# Patient Record
Sex: MALE
[De-identification: names, ages, dates, MRNs, and addresses within clinical notes are randomized per-mention and may not be internally consistent; named-entity substitution may affect disease eponyms.]

---

## 2022-06-02 ENCOUNTER — HOSPITAL ENCOUNTER (INPATIENT)
Dept: HOSPITAL 95 - MEDS | Age: 67
LOS: 2 days | Discharge: HOME | DRG: 247 | End: 2022-06-04
Attending: HOSPITALIST | Admitting: INTERNAL MEDICINE
Payer: COMMERCIAL

## 2022-06-02 VITALS — BODY MASS INDEX: 22.63 KG/M2 | WEIGHT: 156.31 LBS | HEIGHT: 69.69 IN

## 2022-06-02 DIAGNOSIS — E03.9: ICD-10-CM

## 2022-06-02 DIAGNOSIS — I21.4: Primary | ICD-10-CM

## 2022-06-02 DIAGNOSIS — I10: ICD-10-CM

## 2022-06-02 DIAGNOSIS — Z79.899: ICD-10-CM

## 2022-06-02 DIAGNOSIS — I27.20: ICD-10-CM

## 2022-06-02 DIAGNOSIS — Z79.82: ICD-10-CM

## 2022-06-02 DIAGNOSIS — Z88.0: ICD-10-CM

## 2022-06-02 LAB
ALBUMIN SERPL BCP-MCNC: 2.6 G/DL (ref 3.4–5)
ALBUMIN/GLOB SERPL: 0.7 {RATIO} (ref 0.8–1.8)
ALT SERPL W P-5'-P-CCNC: 35 U/L (ref 12–78)
ANION GAP SERPL CALCULATED.4IONS-SCNC: 5 MMOL/L (ref 6–16)
AST SERPL W P-5'-P-CCNC: 19 U/L (ref 12–37)
BASOPHILS # BLD AUTO: 0.06 K/MM3 (ref 0–0.23)
BASOPHILS NFR BLD AUTO: 1 % (ref 0–2)
BILIRUB SERPL-MCNC: 0.7 MG/DL (ref 0.1–1)
BUN SERPL-MCNC: 24 MG/DL (ref 8–24)
CALCIUM SERPL-MCNC: 10 MG/DL (ref 8.5–10.1)
CHLORIDE SERPL-SCNC: 106 MMOL/L (ref 98–108)
CO2 SERPL-SCNC: 29 MMOL/L (ref 21–32)
CREAT SERPL-MCNC: 1.08 MG/DL (ref 0.6–1.2)
DEPRECATED RDW RBC AUTO: 43.8 FL (ref 35.1–46.3)
EOSINOPHIL # BLD AUTO: 0.15 K/MM3 (ref 0–0.68)
EOSINOPHIL NFR BLD AUTO: 2 % (ref 0–6)
ERYTHROCYTE [DISTWIDTH] IN BLOOD BY AUTOMATED COUNT: 13.7 % (ref 11.7–14.2)
GLOBULIN SER CALC-MCNC: 3.6 G/DL (ref 2.2–4)
GLUCOSE SERPL-MCNC: 92 MG/DL (ref 70–99)
HCT VFR BLD AUTO: 48 % (ref 37–53)
HGB BLD-MCNC: 15.8 G/DL (ref 13.5–17.5)
IMM GRANULOCYTES # BLD AUTO: 0.43 K/MM3 (ref 0–0.1)
IMM GRANULOCYTES NFR BLD AUTO: 4 % (ref 0–1)
LYMPHOCYTES # BLD AUTO: 2.53 K/MM3 (ref 0.84–5.2)
LYMPHOCYTES NFR BLD AUTO: 25 % (ref 21–46)
MAGNESIUM SERPL-MCNC: 2.5 MG/DL (ref 1.6–2.4)
MCHC RBC AUTO-ENTMCNC: 32.9 G/DL (ref 31.5–36.5)
MCV RBC AUTO: 88 FL (ref 80–100)
MONOCYTES # BLD AUTO: 0.96 K/MM3 (ref 0.16–1.47)
MONOCYTES NFR BLD AUTO: 10 % (ref 4–13)
NEUTROPHILS # BLD AUTO: 5.98 K/MM3 (ref 1.96–9.15)
NEUTROPHILS NFR BLD AUTO: 59 % (ref 41–73)
NRBC # BLD AUTO: 0 K/MM3 (ref 0–0.02)
NRBC BLD AUTO-RTO: 0 /100 WBC (ref 0–0.2)
PLATELET # BLD AUTO: 174 K/MM3 (ref 150–400)
POTASSIUM SERPL-SCNC: 4.2 MMOL/L (ref 3.5–5.5)
PROT SERPL-MCNC: 6.2 G/DL (ref 6.4–8.2)
SODIUM SERPL-SCNC: 140 MMOL/L (ref 136–145)
UFH PPP CHRO-ACNC: 0.63 IU/ML

## 2022-06-02 PROCEDURE — C1887 CATHETER, GUIDING: HCPCS

## 2022-06-02 PROCEDURE — C9600 PERC DRUG-EL COR STENT SING: HCPCS

## 2022-06-02 PROCEDURE — C1894 INTRO/SHEATH, NON-LASER: HCPCS

## 2022-06-02 PROCEDURE — C1769 GUIDE WIRE: HCPCS

## 2022-06-02 PROCEDURE — C1725 CATH, TRANSLUMIN NON-LASER: HCPCS

## 2022-06-02 PROCEDURE — A9270 NON-COVERED ITEM OR SERVICE: HCPCS

## 2022-06-02 PROCEDURE — C1874 STENT, COATED/COV W/DEL SYS: HCPCS

## 2022-06-03 LAB
ANION GAP SERPL CALCULATED.4IONS-SCNC: 6 MMOL/L (ref 6–16)
BASOPHILS # BLD AUTO: 0.06 K/MM3 (ref 0–0.23)
BASOPHILS NFR BLD AUTO: 1 % (ref 0–2)
BUN SERPL-MCNC: 25 MG/DL (ref 8–24)
CALCIUM SERPL-MCNC: 9.6 MG/DL (ref 8.5–10.1)
CHLORIDE SERPL-SCNC: 106 MMOL/L (ref 98–108)
CHOLEST SERPL-MCNC: 179 MG/DL (ref 50–200)
CHOLEST/HDLC SERPL: 3.1 {RATIO}
CO2 SERPL-SCNC: 28 MMOL/L (ref 21–32)
CREAT SERPL-MCNC: 1.14 MG/DL (ref 0.6–1.2)
DEPRECATED RDW RBC AUTO: 43.4 FL (ref 35.1–46.3)
EOSINOPHIL # BLD AUTO: 0.17 K/MM3 (ref 0–0.68)
EOSINOPHIL NFR BLD AUTO: 2 % (ref 0–6)
ERYTHROCYTE [DISTWIDTH] IN BLOOD BY AUTOMATED COUNT: 13.7 % (ref 11.7–14.2)
GLUCOSE SERPL-MCNC: 120 MG/DL (ref 70–99)
HCT VFR BLD AUTO: 47.2 % (ref 37–53)
HDLC SERPL-MCNC: 58 MG/DL (ref 39–?)
HGB BLD-MCNC: 15.7 G/DL (ref 13.5–17.5)
IMM GRANULOCYTES # BLD AUTO: 0.34 K/MM3 (ref 0–0.1)
IMM GRANULOCYTES NFR BLD AUTO: 3 % (ref 0–1)
LDLC SERPL CALC-MCNC: 86 MG/DL (ref 0–110)
LDLC/HDLC SERPL: 1.5 {RATIO}
LYMPHOCYTES # BLD AUTO: 2.29 K/MM3 (ref 0.84–5.2)
LYMPHOCYTES NFR BLD AUTO: 22 % (ref 21–46)
MCHC RBC AUTO-ENTMCNC: 33.3 G/DL (ref 31.5–36.5)
MCV RBC AUTO: 87 FL (ref 80–100)
MONOCYTES # BLD AUTO: 1.04 K/MM3 (ref 0.16–1.47)
MONOCYTES NFR BLD AUTO: 10 % (ref 4–13)
NEUTROPHILS # BLD AUTO: 6.51 K/MM3 (ref 1.96–9.15)
NEUTROPHILS NFR BLD AUTO: 63 % (ref 41–73)
NRBC # BLD AUTO: 0 K/MM3 (ref 0–0.02)
NRBC BLD AUTO-RTO: 0 /100 WBC (ref 0–0.2)
PLATELET # BLD AUTO: 181 K/MM3 (ref 150–400)
POTASSIUM SERPL-SCNC: 4.3 MMOL/L (ref 3.5–5.5)
SODIUM SERPL-SCNC: 140 MMOL/L (ref 136–145)
TRIGL SERPL-MCNC: 173 MG/DL (ref 30–160)
TSH SERPL DL<=0.005 MIU/L-ACNC: 0.15 UIU/ML (ref 0.36–4.8)
VLDLC SERPL CALC-MCNC: 34 MG/DL (ref 6–32)

## 2022-06-03 PROCEDURE — 4A023N7 MEASUREMENT OF CARDIAC SAMPLING AND PRESSURE, LEFT HEART, PERCUTANEOUS APPROACH: ICD-10-PCS

## 2022-06-03 PROCEDURE — 027034Z DILATION OF CORONARY ARTERY, ONE ARTERY WITH DRUG-ELUTING INTRALUMINAL DEVICE, PERCUTANEOUS APPROACH: ICD-10-PCS | Performed by: INTERNAL MEDICINE

## 2022-06-03 PROCEDURE — B2111ZZ FLUOROSCOPY OF MULTIPLE CORONARY ARTERIES USING LOW OSMOLAR CONTRAST: ICD-10-PCS

## 2022-06-03 NOTE — NUR
SHIFT ASSESSMENT
 
ASSUMED CARE OF PT @ 1900. TRANSLATION PHONE USED TO COMMUNICATE, PT ONLY
SPEAKS Estonian. PT ALERT AND ORIENTED, FOLLOWS ALL COMMANDS. C/O MILD CP,
MEDICATED c PRN ACETAMINOPHEN. CP DOES NOT RADIATE, PT DENIES SOB. VSS. SINUS/
SINUS TACH ON CARDIAC MONITOR. CALL LIGHT IN REACH, WILL MONITOR CLOSELY.

## 2022-06-03 NOTE — NUR
IN ROOM TO DISCUSS ANGIOGRAM AND POSSIBLE STENT PLACEMENT.
 PHONE USED, CONSENT SIGNED IN PT'S FIRST LANGUAGE. HEPARIN STOPPED
PER .

## 2022-06-03 NOTE — NUR
ASSUMED CARE
PT ARRIVED AT APPROX 2140 VIA WHEELCHAIR WITH ALL BELONGINGS. PT VITALS ARE
STABLE. DENIES CHEST PAIN BUT REPORTS FEELING SHORT OF BREATH. PT PUT ON 2L NC
AND HAS SATS OF ABOVE 92%.  WIFE IS AT BEDSIDE.

## 2022-06-03 NOTE — NUR
SUMMARY
NEURO; A/O X3 - REORIENTED TO FACILITY ([place] FROM [place])
CARDIAC; NSR/SINUS TACH ON TELE. STENT PLACED IN RCA, TR BAND REMOVED FROM RT
RADIAL WITHOUT INCIDENT. CP DECREASED SINCE PROCEDURE.
LUNGS;WNL
SKIN: WNL
GI:WNL
:WNL
 
ON ROOM AIR, NO DRIPS RUNNING

## 2022-06-03 NOTE — NUR
PT CONTINUES WITHOUT COMPLAINTS OF CHEST PAIN OR DYSPNEA. HAS BEEN NPO SINCE
2400 PENDING CARDIOLOGY CONSULT. PT'S WIFE, SEN ROOMS IN FOR THE NIGHT. PT
HAS BEEN ABLE TO MOVE ABOUT IN BED ON HIS OWN. STANDS AT SIDE OF BED TO VOID,
Q.S. HEPARIN CONTINUES PER PHARMACY. WILL CONTINUE TO MONITOR PT, AND WILL
REPORT OFF TO ONCOMING RN.

## 2022-06-03 NOTE — NUR
PT DIRECT ADMITTED TO ICU 6 UNDER PCU STATUS. REPORT RECEIVED FROM REACH TEAM.
PT NON ENGLISH SPEAKING. WITH THIS RN'S SOMEWHAT LIMITED Albanian WAS ABLE TO
INTERVIEW PT. HE DENIES CHEST PAIN. IS ALERT AND ORIENTED. PLEASANT AND
COOPERATIVE WITH CARE AND ASSESSMENT. WILL REVIEW CHART AND PLAN OF CARE FOR
THIS PT.

## 2022-06-04 NOTE — NUR
DISCHARGE UPDATE
DISCHARGE PACKET GONE OVER WITH PT, PT WIFE, AND PT DAUGHTER AT 1220. STENT
CARD IN DISCHARGE PACKET AND WITH PT FOR DISCHARGE. PT LEFT UNIT AT 1230 VIA
WHEELCHAIR, PT ABLE TO TRANSFER SELF TO AND FROM WHEELCHAIR. PT BELONGINGS IN
BAGS AND WITH PT WIFE DURING DISCHARGE.

## 2022-06-04 NOTE — NUR
SHIFT SUMMARY
PT IS ALERT AND ORIENTED. Gambian SPEAKING ONLY. VITALS ARE STABLE AND IS ON
ROOM AIR AT THE TIME WITH SATS ABOVE 95%. HE WAS ON 1L NC WHEN HE ARRIVED TO
PCU BECAUSE HE WAS REPORTING SOB WITH DEEP INSPIRATIONS. PT DENIES CHEST
PAIN/PRESSURE. PT IS ABLE TO AMBULATE AROUND ROOM WITH STABLE GAIT AND USING
URINAL AT BEDSIDE. WIFE IS AT BEDSIDE AND IS ALSO Gambian SPEAKING ONLY. CALL
LIGHT IS WITHIN REACH.